# Patient Record
Sex: MALE | Race: WHITE | Employment: FULL TIME | URBAN - NONMETROPOLITAN AREA
[De-identification: names, ages, dates, MRNs, and addresses within clinical notes are randomized per-mention and may not be internally consistent; named-entity substitution may affect disease eponyms.]

---

## 2017-10-08 ENCOUNTER — APPOINTMENT (OUTPATIENT)
Dept: GENERAL RADIOLOGY | Age: 52
End: 2017-10-08
Payer: COMMERCIAL

## 2017-10-08 ENCOUNTER — HOSPITAL ENCOUNTER (EMERGENCY)
Age: 52
Discharge: HOME | End: 2017-10-08
Attending: EMERGENCY MEDICINE
Payer: COMMERCIAL

## 2017-10-08 VITALS
OXYGEN SATURATION: 99 % | WEIGHT: 256 LBS | BODY MASS INDEX: 36.65 KG/M2 | HEART RATE: 88 BPM | DIASTOLIC BLOOD PRESSURE: 77 MMHG | HEIGHT: 70 IN | RESPIRATION RATE: 18 BRPM | TEMPERATURE: 97.2 F | SYSTOLIC BLOOD PRESSURE: 135 MMHG

## 2017-10-08 DIAGNOSIS — S46.001A ROTATOR CUFF INJURY, RIGHT, INITIAL ENCOUNTER: ICD-10-CM

## 2017-10-08 DIAGNOSIS — M25.511 ACUTE PAIN OF RIGHT SHOULDER: Primary | ICD-10-CM

## 2017-10-08 PROCEDURE — 73030 X-RAY EXAM OF SHOULDER: CPT

## 2017-10-08 PROCEDURE — 99283 EMERGENCY DEPT VISIT LOW MDM: CPT

## 2017-10-08 PROCEDURE — A4565 SLINGS: HCPCS

## 2017-10-08 PROCEDURE — 6370000000 HC RX 637 (ALT 250 FOR IP): Performed by: EMERGENCY MEDICINE

## 2017-10-08 ASSESSMENT — PAIN DESCRIPTION - ORIENTATION: ORIENTATION: RIGHT

## 2017-10-08 ASSESSMENT — PAIN SCALES - GENERAL: PAINLEVEL_OUTOF10: 6

## 2017-10-08 ASSESSMENT — PAIN DESCRIPTION - LOCATION: LOCATION: SHOULDER

## 2017-10-08 ASSESSMENT — PAIN DESCRIPTION - PAIN TYPE: TYPE: ACUTE PAIN

## 2017-10-08 NOTE — ED NOTES
Discharge instructions given to patient and wife who both verbalized understanding. AVS reviewed with patient. Color pink. Skin warm and dry to touch. Respirations easy and unlabored. Pain scale 1 to 10 = 2. Sling intact   Good cap refill less than 2 seconds. Strong radial pulse noted. Patient ambulated to car with wife as . Patient has appointment to see Dr Jason Lloyd 10/10/17 at 1250 at [facility].      Titus Monroe RN  10/08/17 Torrie Sargent RN  10/08/17 0792

## 2017-10-08 NOTE — ED NOTES
Pt to ED for right shoulder pain. Pt states he was on a ladder and fell today. Ice applied.      Tresa Stephenson RN  10/08/17 0699

## 2017-10-08 NOTE — ED PROVIDER NOTES
Constitutional: He appears well-developed and well-nourished. He appears distressed. Neck:   No C-spine tenderness   Cardiovascular: Normal rate and regular rhythm. Pulmonary/Chest: Effort normal and breath sounds normal. He exhibits no tenderness. Musculoskeletal:   Examination of the right shoulder showed no deformity, no sign of injury. He has tenderness to the right anterior lateral deltoid area. He is unable to raise his  arm overhead. He has normal motor function and sensation of his right hand and wrist.       DIAGNOSTIC RESULTS       RADIOLOGY:   Right shoulder x-ray showed no fracture or dislocation. LABS:   Labs Reviewed - No data to display    EMERGENCY DEPARTMENT COURSE:   Vitals:    Vitals:    10/08/17 1827   BP: 135/77   Pulse: 88   Resp: 18   Temp: 97.2 °F (36.2 °C)   TempSrc: Oral   SpO2: 99%   Weight: 256 lb (116.1 kg)   Height: 5' 9.5\" (1.765 m)     He received a right shoulder sling and Norco starter pack. Also received ice pack. FINAL IMPRESSION      1. Acute pain of right shoulder    2. Rotator cuff injury, right, initial encounter          DISPOSITION/PLAN   He was discharged home in stable condition with discharge instructions, follow-up with orthopedist in 2 days, return if worse or new symptoms.     PATIENT REFERRED TO:  Petros Amor MD  [facility], [address] [place] [address]  [phone]    In 2 days        DISCHARGE MEDICATIONS:  New Prescriptions    No medications on file       (Please note that portions of this note were completed with a voice recognition program.  Efforts were made to edit the dictations but occasionally words are mis-transcribed.)    MD Sonia Fitzpatrick MD  10/08/17 1495

## 2017-10-17 ENCOUNTER — HOSPITAL ENCOUNTER (OUTPATIENT)
Dept: MRI IMAGING | Age: 52
Discharge: HOME | End: 2017-10-17
Payer: COMMERCIAL

## 2017-10-17 DIAGNOSIS — M25.511 ACUTE PAIN OF RIGHT SHOULDER: ICD-10-CM
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
## 2017-10-17 DIAGNOSIS — S46.011A ROTATOR CUFF STRAIN, RIGHT, INITIAL ENCOUNTER: ICD-10-CM

## 2017-10-19 ENCOUNTER — HOSPITAL ENCOUNTER (OUTPATIENT)
Dept: MRI IMAGING | Age: 52
Discharge: HOME | End: 2017-10-19
Payer: COMMERCIAL
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
## 2017-10-19 DIAGNOSIS — S46.011A ROTATOR CUFF STRAIN, RIGHT, INITIAL ENCOUNTER: ICD-10-CM

## 2017-10-19 DIAGNOSIS — M25.511 ACUTE PAIN OF RIGHT SHOULDER: ICD-10-CM

## 2017-10-19 PROCEDURE — 73221 MRI JOINT UPR EXTREM W/O DYE: CPT

## 2017-10-31 ENCOUNTER — HOSPITAL ENCOUNTER (OUTPATIENT)
Age: 52
Discharge: HOME | End: 2017-10-31
Payer: COMMERCIAL

## 2017-10-31 ENCOUNTER — HOSPITAL ENCOUNTER (OUTPATIENT)
Dept: GENERAL RADIOLOGY | Age: 52
Discharge: HOME | End: 2017-10-31
Payer: COMMERCIAL

## 2017-10-31 DIAGNOSIS — M75.121 COMPLETE TEAR OF RIGHT ROTATOR CUFF: ICD-10-CM

## 2017-10-31 DIAGNOSIS — Z01.818 PREOP EXAMINATION: ICD-10-CM

## 2017-10-31 DIAGNOSIS — M75.41 IMPINGEMENT SYNDROME OF RIGHT SHOULDER: ICD-10-CM

## 2017-10-31 LAB
ANION GAP SERPL CALCULATED.3IONS-SCNC: 12 MEQ/L (ref 8–16)
BASOPHILS # BLD: 0.6 %
BASOPHILS ABSOLUTE: 0.1 THOU/MM3 (ref 0–0.1)
BUN BLDV-MCNC: 16 MG/DL (ref 7–22)
CALCIUM SERPL-MCNC: 9.7 MG/DL (ref 8.5–10.5)
CHLORIDE BLD-SCNC: 105 MEQ/L (ref 98–111)
CO2: 27 MEQ/L (ref 23–33)
CREAT SERPL-MCNC: 0.9 MG/DL (ref 0.4–1.2)
EOSINOPHIL # BLD: 3.4 %
EOSINOPHILS ABSOLUTE: 0.3 THOU/MM3 (ref 0–0.4)
GFR SERPL CREATININE-BSD FRML MDRD: 88 ML/MIN/1.73M2
GLUCOSE BLD-MCNC: 90 MG/DL (ref 70–108)
HCT VFR BLD CALC: 41.9 % (ref 42–52)
HEMOGLOBIN: 14.1 GM/DL (ref 14–18)
LYMPHOCYTES # BLD: 15 %
LYMPHOCYTES ABSOLUTE: 1.4 THOU/MM3 (ref 1–4.8)
MCH RBC QN AUTO: 28 PG (ref 27–31)
MCHC RBC AUTO-ENTMCNC: 33.6 GM/DL (ref 33–37)
MCV RBC AUTO: 83.3 FL (ref 80–94)
MONOCYTES # BLD: 7.4 %
MONOCYTES ABSOLUTE: 0.7 THOU/MM3 (ref 0.4–1.3)
MRSA SCREEN RT-PCR: NEGATIVE
NUCLEATED RED BLOOD CELLS: 0 /100 WBC
PDW BLD-RTO: 14.4 % (ref 11.5–14.5)
PLATELET # BLD: 241 THOU/MM3 (ref 130–400)
PMV BLD AUTO: 9.6 MCM (ref 7.4–10.4)
POTASSIUM SERPL-SCNC: 4.3 MEQ/L (ref 3.5–5.2)
RBC # BLD: 5.04 MILL/MM3 (ref 4.7–6.1)
SEG NEUTROPHILS: 73.6 %
SEGMENTED NEUTROPHILS ABSOLUTE COUNT: 6.7 THOU/MM3 (ref 1.8–7.7)
SODIUM BLD-SCNC: 144 MEQ/L (ref 135–145)
WBC # BLD: 9.1 THOU/MM3 (ref 4.8–10.8)

## 2017-10-31 PROCEDURE — 85025 COMPLETE CBC W/AUTO DIFF WBC: CPT

## 2017-10-31 PROCEDURE — 87641 MR-STAPH DNA AMP PROBE: CPT

## 2017-10-31 PROCEDURE — 71020 XR CHEST STANDARD TWO VW: CPT

## 2017-10-31 PROCEDURE — 93005 ELECTROCARDIOGRAM TRACING: CPT

## 2017-10-31 PROCEDURE — 80048 BASIC METABOLIC PNL TOTAL CA: CPT

## 2017-10-31 PROCEDURE — 36415 COLL VENOUS BLD VENIPUNCTURE: CPT

## 2017-11-06 ENCOUNTER — OFFICE VISIT (OUTPATIENT)
Dept: INTERNAL MEDICINE CLINIC | Age: 52
End: 2017-11-06
Payer: COMMERCIAL

## 2017-11-06 VITALS
HEART RATE: 76 BPM | WEIGHT: 262 LBS | HEIGHT: 69 IN | BODY MASS INDEX: 38.8 KG/M2 | DIASTOLIC BLOOD PRESSURE: 70 MMHG | SYSTOLIC BLOOD PRESSURE: 110 MMHG

## 2017-11-06 DIAGNOSIS — K21.9 GASTROESOPHAGEAL REFLUX DISEASE, ESOPHAGITIS PRESENCE NOT SPECIFIED: ICD-10-CM

## 2017-11-06 DIAGNOSIS — E66.9 OBESITY (BMI 30-39.9): ICD-10-CM

## 2017-11-06 DIAGNOSIS — R94.31 ABNORMAL EKG: ICD-10-CM

## 2017-11-06 DIAGNOSIS — M75.101 TEAR OF RIGHT ROTATOR CUFF, UNSPECIFIED TEAR EXTENT: ICD-10-CM

## 2017-11-06 DIAGNOSIS — Z01.818 PREOP EXAM FOR INTERNAL MEDICINE: Primary | ICD-10-CM

## 2017-11-06 LAB
EKG ATRIAL RATE: 77 BPM
EKG P AXIS: -2 DEGREES
EKG P-R INTERVAL: 154 MS
EKG Q-T INTERVAL: 414 MS
EKG QRS DURATION: 150 MS
EKG QTC CALCULATION (BAZETT): 468 MS
EKG R AXIS: -83 DEGREES
EKG T AXIS: 24 DEGREES
EKG VENTRICULAR RATE: 77 BPM

## 2017-11-06 PROCEDURE — 99244 OFF/OP CNSLTJ NEW/EST MOD 40: CPT | Performed by: INTERNAL MEDICINE

## 2017-11-06 RX ORDER — IBUPROFEN 200 MG
200 TABLET ORAL EVERY 6 HOURS PRN
COMMUNITY

## 2017-11-06 NOTE — PROGRESS NOTES
deteriorated after that     Cancer Father      vocal cord, used alcohol and tobacco     Cancer Brother      bone cancer, broke neck    Heart Attack Brother      massive MI, heavy smoker, 12 pack Mt. Dew daily     Other Brother       under suspicious circumstances     Sudden Death Son      MVA    Glaucoma Neg Hx        Review of Systems - General ROS: negative for - chills or fever  Psychological ROS: negative for - anxiety and depression  Hematological and Lymphatic ROS: No history of blood clots or bleeding disorder. Respiratory ROS: no cough, shortness of breath, or wheezing  Cardiovascular ROS: no chest pain, positive dyspnea on exertion  Gastrointestinal ROS: no abdominal pain, change in bowel habits, or black or bloody stools  Genito-Urinary ROS: no dysuria, trouble voiding, or hematuria  Musculoskeletal ROS: negative for - muscle pain or muscular weakness, positive right shoulder pain  Neurological ROS: negative for - headaches, numbness/tingling, seizures or weakness  Dermatological ROS: negative for - rash or skin lesion changes    Blood pressure 110/70, pulse 76, height 5' 9\" (1.753 m), weight 262 lb (118.8 kg). Physical Examination: General appearance -alert, well appearing, and in no distress  Mental status - alert, oriented to person, place, and time  Head - atraumatic, normocephalic  Eyes - pupils equal and reactive to light, extraocular muscles intact  Ears - external ears are normal, hearing is grossly normal  Mouth - oral pharynx clear, mucous membranes moist  Neck - supple, no significant adenopathy  Chest - clear to auscultation, no wheezes, rales or rhonchi, symmetric air entry  Heart - normal rate, regular rhythm, normal S1, S2, no murmurs, rubs, clicks or gallops  Abdomen -soft, nontender, nondistended  Neurological - alert, oriented, cranial nerves II-XII intact, motor and sensation are grossly intact bilateral upper and lower extremities.   Extremities - peripheral pulses normal, no pedal edema, no clubbing or cyanosis  Skin - warm and dry    Diagnostic data:   I have reviewed recent diagnostic testing including labs, EKG, CXR. EKG with bifascicular block - last stress test 20 years ago. Brother  in 29's with sudden death. Assessment and Plan:    Patient is an acceptable surgical candidate for planned procedure pending stress test.    Cardiac risk assessment:  Patient has intermediate clinical predictors of cardiac risk and tolerates less than 4 mets of activity. Patient is scheduled for an elective intermediate risk surgery and is considered at an acceptable cardiac risk based on ACC/AHA criteria if stress test acceptable. Patient describes pain with hip and knee on the right - will order Lexiscan stress test rather than exercise stress test due to this. 1. Preop exam for internal medicine     2. Tear of right rotator cuff, unspecified tear extent     3. Abnormal EKG  Stress test, lexiscan   4. Obesity (BMI 30-39.9)     5. Gastroesophageal reflux disease, esophagitis presence not specified       Stop ibuprofen one week prior to surgery. Obesity - BMI 38.7 - recommended weight loss by decreasing calories, better choices of food and exercise as tolerates. GERD - controlled, just uses medication prn. DVT prophylaxis - Recommend early ambulation, venous return exercises. Allergies: Review of patient's allergies indicates no known allergies. Isabelle Blackwood MD    Addendum 11/10/17 11:20: Reviewed stress test - no ischemia and normal EF. May proceed with surgery. Isabelle Blackwood MD     [facility] [place] [facility]  PHYSICIANS [facility]. [place] [address]  [address]  [address] [place] [address]  Dept: [phone]  Dept Fax: [phone] : [phone]

## 2017-11-28 ENCOUNTER — HOSPITAL ENCOUNTER (OUTPATIENT)
Age: 52
Discharge: HOME | End: 2017-11-28
Payer: COMMERCIAL

## 2017-11-28 ENCOUNTER — HOSPITAL ENCOUNTER (OUTPATIENT)
Dept: GENERAL RADIOLOGY | Age: 52
Discharge: HOME | End: 2017-11-28
Payer: COMMERCIAL

## 2017-11-28 DIAGNOSIS — M25.511 RIGHT SHOULDER PAIN, UNSPECIFIED CHRONICITY: ICD-10-CM

## 2017-11-28 PROCEDURE — 73030 X-RAY EXAM OF SHOULDER: CPT

## 2017-12-28 ENCOUNTER — OFFICE VISIT (OUTPATIENT)
Dept: OPTOMETRY | Age: 52
End: 2017-12-28
Payer: COMMERCIAL

## 2017-12-28 DIAGNOSIS — H52.203 HYPEROPIA OF BOTH EYES WITH ASTIGMATISM AND PRESBYOPIA: Primary | ICD-10-CM

## 2017-12-28 DIAGNOSIS — H52.03 HYPEROPIA OF BOTH EYES WITH ASTIGMATISM AND PRESBYOPIA: Primary | ICD-10-CM

## 2017-12-28 DIAGNOSIS — H52.4 HYPEROPIA OF BOTH EYES WITH ASTIGMATISM AND PRESBYOPIA: Primary | ICD-10-CM

## 2017-12-28 PROCEDURE — 92014 COMPRE OPH EXAM EST PT 1/>: CPT | Performed by: OPTOMETRIST

## 2017-12-28 RX ORDER — BENOXINATE HCL/FLUORESCEIN SOD 0.4%-0.25%
1 DROPS OPHTHALMIC (EYE) ONCE
Status: COMPLETED | OUTPATIENT
Start: 2017-12-28 | End: 2017-12-28

## 2017-12-28 RX ORDER — HYDROCODONE BITARTRATE AND ACETAMINOPHEN 5; 325 MG/1; MG/1
1 TABLET ORAL EVERY 6 HOURS PRN
COMMUNITY
End: 2019-01-09

## 2017-12-28 RX ADMIN — Medication 1 DROP: at 14:09

## 2017-12-28 ASSESSMENT — REFRACTION_WEARINGRX
OS_CYLINDER: -0.75
OS_AXIS: 077
SPECS_TYPE: PAL
OD_SPHERE: +1.00
OD_CYLINDER: -1.00
OS_SPHERE: +0.25
OD_AXIS: 070
OD_ADD: +2.00
OS_ADD: +2.00

## 2017-12-28 ASSESSMENT — VISUAL ACUITY
OD_CC: 20/25 OU
CORRECTION_TYPE: GLASSES
OS_CC: 20/20
METHOD: SNELLEN - LINEAR

## 2017-12-28 ASSESSMENT — REFRACTION_MANIFEST
OD_AXIS: 071
OD_CYLINDER: -1.50
OS_CYLINDER: -1.25
OD_SPHERE: +1.25
OS_SPHERE: +0.75
OS_AXIS: 080
OS_ADD: +2.00
OD_ADD: +2.00

## 2017-12-28 ASSESSMENT — TONOMETRY
OD_IOP_MMHG: 16
IOP_METHOD: APPLANATION W FLURESS DROP
OS_IOP_MMHG: 15

## 2017-12-28 ASSESSMENT — SLIT LAMP EXAM - LIDS
COMMENTS: NORMAL
COMMENTS: NORMAL

## 2018-01-02 ENCOUNTER — HOSPITAL ENCOUNTER (OUTPATIENT)
Dept: GENERAL RADIOLOGY | Age: 53
Discharge: HOME | End: 2018-01-02
Payer: COMMERCIAL

## 2018-01-02 ENCOUNTER — HOSPITAL ENCOUNTER (OUTPATIENT)
Age: 53
Discharge: HOME | End: 2018-01-02
Payer: COMMERCIAL

## 2018-01-02 DIAGNOSIS — M25.511 RIGHT SHOULDER PAIN, UNSPECIFIED CHRONICITY: ICD-10-CM

## 2018-01-02 PROCEDURE — 73030 X-RAY EXAM OF SHOULDER: CPT

## 2018-04-02 ENCOUNTER — HOSPITAL ENCOUNTER (OUTPATIENT)
Dept: ULTRASOUND IMAGING | Age: 53
Discharge: HOME | End: 2018-04-02
Payer: COMMERCIAL

## 2018-04-02 DIAGNOSIS — N43.3 HYDROCELE, UNSPECIFIED HYDROCELE TYPE: ICD-10-CM

## 2018-04-02 PROCEDURE — 76870 US EXAM SCROTUM: CPT

## 2018-04-04 ENCOUNTER — HOSPITAL ENCOUNTER (OUTPATIENT)
Age: 53
Discharge: HOME | End: 2018-04-04
Payer: COMMERCIAL

## 2018-04-04 LAB
ANION GAP SERPL CALCULATED.3IONS-SCNC: 13 MEQ/L (ref 8–16)
BASOPHILS # BLD: 0.6 %
BASOPHILS ABSOLUTE: 0 THOU/MM3 (ref 0–0.1)
BUN BLDV-MCNC: 14 MG/DL (ref 7–22)
CALCIUM SERPL-MCNC: 9.8 MG/DL (ref 8.5–10.5)
CHLORIDE BLD-SCNC: 103 MEQ/L (ref 98–111)
CHOLESTEROL, TOTAL: 173 MG/DL (ref 100–199)
CO2: 28 MEQ/L (ref 23–33)
CREAT SERPL-MCNC: 0.8 MG/DL (ref 0.4–1.2)
EOSINOPHIL # BLD: 3.2 %
EOSINOPHILS ABSOLUTE: 0.2 THOU/MM3 (ref 0–0.4)
GFR SERPL CREATININE-BSD FRML MDRD: > 90 ML/MIN/1.73M2
GLUCOSE BLD-MCNC: 91 MG/DL (ref 70–108)
HCT VFR BLD CALC: 39.7 % (ref 42–52)
HDLC SERPL-MCNC: 36 MG/DL
HEMOGLOBIN: 13.5 GM/DL (ref 14–18)
LDL CHOLESTEROL CALCULATED: 110 MG/DL
LYMPHOCYTES # BLD: 18.5 %
LYMPHOCYTES ABSOLUTE: 1.4 THOU/MM3 (ref 1–4.8)
MCH RBC QN AUTO: 27.8 PG (ref 27–31)
MCHC RBC AUTO-ENTMCNC: 34 GM/DL (ref 33–37)
MCV RBC AUTO: 81.9 FL (ref 80–94)
MONOCYTES # BLD: 6 %
MONOCYTES ABSOLUTE: 0.5 THOU/MM3 (ref 0.4–1.3)
NUCLEATED RED BLOOD CELLS: 0 /100 WBC
PDW BLD-RTO: 14.3 % (ref 11.5–14.5)
PLATELET # BLD: 241 THOU/MM3 (ref 130–400)
PMV BLD AUTO: 9.1 FL (ref 7.4–10.4)
POTASSIUM SERPL-SCNC: 4.5 MEQ/L (ref 3.5–5.2)
RBC # BLD: 4.85 MILL/MM3 (ref 4.7–6.1)
SEG NEUTROPHILS: 71.7 %
SEGMENTED NEUTROPHILS ABSOLUTE COUNT: 5.5 THOU/MM3 (ref 1.8–7.7)
SODIUM BLD-SCNC: 144 MEQ/L (ref 135–145)
TRIGL SERPL-MCNC: 134 MG/DL (ref 0–199)
WBC # BLD: 7.7 THOU/MM3 (ref 4.8–10.8)

## 2018-04-04 PROCEDURE — 83036 HEMOGLOBIN GLYCOSYLATED A1C: CPT

## 2018-04-04 PROCEDURE — G0103 PSA SCREENING: HCPCS

## 2018-04-04 PROCEDURE — 80048 BASIC METABOLIC PNL TOTAL CA: CPT

## 2018-04-04 PROCEDURE — 80061 LIPID PANEL: CPT

## 2018-04-04 PROCEDURE — 85025 COMPLETE CBC W/AUTO DIFF WBC: CPT

## 2018-04-04 PROCEDURE — 36415 COLL VENOUS BLD VENIPUNCTURE: CPT

## 2018-04-05 LAB
AVERAGE GLUCOSE: 105 MG/DL (ref 70–126)
HBA1C MFR BLD: 5.5 % (ref 4.4–6.4)
PROSTATE SPECIFIC ANTIGEN: 0.44 NG/ML (ref 0–1)

## 2019-01-09 ENCOUNTER — OFFICE VISIT (OUTPATIENT)
Dept: OPTOMETRY | Age: 54
End: 2019-01-09
Payer: COMMERCIAL

## 2019-01-09 DIAGNOSIS — H52.03 HYPEROPIA OF BOTH EYES WITH ASTIGMATISM AND PRESBYOPIA: Primary | ICD-10-CM

## 2019-01-09 DIAGNOSIS — H52.203 HYPEROPIA OF BOTH EYES WITH ASTIGMATISM AND PRESBYOPIA: Primary | ICD-10-CM

## 2019-01-09 DIAGNOSIS — H52.4 HYPEROPIA OF BOTH EYES WITH ASTIGMATISM AND PRESBYOPIA: Primary | ICD-10-CM

## 2019-01-09 PROCEDURE — 92014 COMPRE OPH EXAM EST PT 1/>: CPT | Performed by: OPTOMETRIST

## 2019-01-09 RX ORDER — TAMSULOSIN HYDROCHLORIDE 0.4 MG/1
CAPSULE ORAL
COMMUNITY
Start: 2018-12-15

## 2019-01-09 ASSESSMENT — REFRACTION_MANIFEST
OS_ADD: +2.25
OS_AXIS: 080
OD_ADD: +2.25
OS_SPHERE: +0.75
OS_CYLINDER: -1.25
OD_AXIS: 071
OD_CYLINDER: -1.50
OD_SPHERE: +1.25

## 2019-01-09 ASSESSMENT — VISUAL ACUITY
OD_CC+: -1
METHOD: SNELLEN - LINEAR
OD_CC: 20/25 OU
OS_CC: 20/25
CORRECTION_TYPE: GLASSES

## 2019-01-09 ASSESSMENT — TONOMETRY
OD_IOP_MMHG: 12
IOP_METHOD: NON-CONTACT AIR PUFF
OS_IOP_MMHG: 11

## 2019-01-09 ASSESSMENT — REFRACTION_WEARINGRX
OS_CYLINDER: -1.25
OS_SPHERE: +0.75
OD_ADD: +2.00
OD_AXIS: 071
OS_ADD: +2.00
OD_SPHERE: +1.25
OD_CYLINDER: -1.50
SPECS_TYPE: PAL
OS_AXIS: 080

## 2019-01-09 ASSESSMENT — KERATOMETRY
OS_AXISANGLE_DEGREES: 066
OD_K2POWER_DIOPTERS: 44.00
OS_K2POWER_DIOPTERS: 44.00
OD_AXISANGLE_DEGREES: 122
OD_AXISANGLE2_DEGREES: 032
OS_K1POWER_DIOPTERS: 43.75
OD_K1POWER_DIOPTERS: 43.75
OS_AXISANGLE2_DEGREES: 156

## 2019-01-09 ASSESSMENT — SLIT LAMP EXAM - LIDS
COMMENTS: NORMAL
COMMENTS: NORMAL

## 2019-05-05 ENCOUNTER — HOSPITAL ENCOUNTER (OUTPATIENT)
Age: 54
Discharge: HOME | End: 2019-05-05
Payer: COMMERCIAL

## 2019-05-05 LAB
ALT SERPL-CCNC: 15 U/L (ref 11–66)
ANION GAP SERPL CALCULATED.3IONS-SCNC: 12 MEQ/L (ref 8–16)
AST SERPL-CCNC: 17 U/L (ref 5–40)
BASOPHILS # BLD: 0.6 %
BASOPHILS ABSOLUTE: 0 THOU/MM3 (ref 0–0.1)
BUN BLDV-MCNC: 18 MG/DL (ref 7–22)
CALCIUM SERPL-MCNC: 8.9 MG/DL (ref 8.5–10.5)
CHLORIDE BLD-SCNC: 107 MEQ/L (ref 98–111)
CHOLESTEROL, FASTING: 170 MG/DL (ref 100–199)
CO2: 24 MEQ/L (ref 23–33)
CREAT SERPL-MCNC: 0.7 MG/DL (ref 0.4–1.2)
EOSINOPHIL # BLD: 3.9 %
EOSINOPHILS ABSOLUTE: 0.3 THOU/MM3 (ref 0–0.4)
ERYTHROCYTE [DISTWIDTH] IN BLOOD BY AUTOMATED COUNT: 13.9 % (ref 11.5–14.5)
ERYTHROCYTE [DISTWIDTH] IN BLOOD BY AUTOMATED COUNT: 42.5 FL (ref 35–45)
GFR SERPL CREATININE-BSD FRML MDRD: > 90 ML/MIN/1.73M2
GLUCOSE FASTING: 97 MG/DL (ref 70–108)
HCT VFR BLD CALC: 41.9 % (ref 42–52)
HDLC SERPL-MCNC: 37 MG/DL
HEMOGLOBIN: 13.3 GM/DL (ref 14–18)
IMMATURE GRANS (ABS): 0.02 THOU/MM3 (ref 0–0.07)
IMMATURE GRANULOCYTES: 0.3 %
LDL CHOLESTEROL CALCULATED: 114 MG/DL
LYMPHOCYTES # BLD: 16.5 %
LYMPHOCYTES ABSOLUTE: 1.1 THOU/MM3 (ref 1–4.8)
MCH RBC QN AUTO: 27 PG (ref 26–33)
MCHC RBC AUTO-ENTMCNC: 31.7 GM/DL (ref 32.2–35.5)
MCV RBC AUTO: 85.2 FL (ref 80–94)
MONOCYTES # BLD: 6.6 %
MONOCYTES ABSOLUTE: 0.4 THOU/MM3 (ref 0.4–1.3)
NUCLEATED RED BLOOD CELLS: 0 /100 WBC
PLATELET # BLD: 232 THOU/MM3 (ref 130–400)
PMV BLD AUTO: 11.1 FL (ref 9.4–12.4)
POTASSIUM SERPL-SCNC: 4 MEQ/L (ref 3.5–5.2)
RBC # BLD: 4.92 MILL/MM3 (ref 4.7–6.1)
SEG NEUTROPHILS: 72.1 %
SEGMENTED NEUTROPHILS ABSOLUTE COUNT: 4.8 THOU/MM3 (ref 1.8–7.7)
SODIUM BLD-SCNC: 143 MEQ/L (ref 135–145)
TRIGLYCERIDE, FASTING: 95 MG/DL (ref 0–199)
TSH SERPL DL<=0.05 MIU/L-ACNC: 1.7 UIU/ML (ref 0.4–4.2)
WBC # BLD: 6.7 THOU/MM3 (ref 4.8–10.8)

## 2019-05-05 PROCEDURE — 84450 TRANSFERASE (AST) (SGOT): CPT

## 2019-05-05 PROCEDURE — 80061 LIPID PANEL: CPT

## 2019-05-05 PROCEDURE — 85025 COMPLETE CBC W/AUTO DIFF WBC: CPT

## 2019-05-05 PROCEDURE — 84443 ASSAY THYROID STIM HORMONE: CPT

## 2019-05-05 PROCEDURE — 84460 ALANINE AMINO (ALT) (SGPT): CPT

## 2019-05-05 PROCEDURE — 36415 COLL VENOUS BLD VENIPUNCTURE: CPT

## 2019-05-05 PROCEDURE — 80048 BASIC METABOLIC PNL TOTAL CA: CPT

## 2020-05-14 ENCOUNTER — OFFICE VISIT (OUTPATIENT)
Dept: OPTOMETRY | Age: 55
End: 2020-05-14
Payer: COMMERCIAL

## 2020-05-14 PROCEDURE — 92014 COMPRE OPH EXAM EST PT 1/>: CPT | Performed by: OPTOMETRIST

## 2020-05-14 ASSESSMENT — REFRACTION_WEARINGRX
OS_ADD: +2.00
OD_CYLINDER: -1.50
SPECS_TYPE: PAL
OD_ADD: +2.00
OS_AXIS: 080
OD_SPHERE: +1.25
OD_AXIS: 071
OS_CYLINDER: -1.25
OS_SPHERE: +0.75

## 2020-05-14 ASSESSMENT — REFRACTION_MANIFEST
OS_ADD: +2.25
OD_ADD: +2.25
OD_SPHERE: +1.25
OS_CYLINDER: -1.50
OD_CYLINDER: -1.75
OS_AXIS: 080
OD_AXIS: 075
OS_SPHERE: +0.75

## 2020-05-14 ASSESSMENT — TONOMETRY
OD_IOP_MMHG: 14
OS_IOP_MMHG: 14
IOP_METHOD: NON-CONTACT AIR PUFF

## 2020-05-14 ASSESSMENT — KERATOMETRY
OS_AXISANGLE_DEGREES: 090
OS_K1POWER_DIOPTERS: 44.00
OS_K2POWER_DIOPTERS: 44.00
OS_AXISANGLE2_DEGREES: 000

## 2020-05-14 ASSESSMENT — SLIT LAMP EXAM - LIDS
COMMENTS: NORMAL
COMMENTS: NORMAL

## 2020-05-14 ASSESSMENT — VISUAL ACUITY
OS_CC+: -1
METHOD: SNELLEN - LINEAR
OD_CC: 20/20 OU
CORRECTION_TYPE: GLASSES
OS_CC: 20/20

## 2020-05-14 NOTE — PROGRESS NOTES
+2.00    Age:  3yrs    Type:  PAL              Manifest Refraction     Manifest Refraction       Sphere Cylinder Axis Dist VA Add    Right +1.25 -1.75 075 20/20 +2.25    Left +0.75 -1.50 080 20/20 +2.25          Manifest Refraction #2 (Auto)       Sphere Cylinder Axis Dist VA Add    Right +1.75 -1.75 079      Left +1.50 -1.50 080                 Final Rx       Sphere Cylinder Axis Add    Right +1.25 -1.75 075 +2.25    Left +0.75 -1.50 080 +2.25    Type:  PAL    Expiration Date:  5/15/2022            1. Hyperopia of both eyes with astigmatism and presbyopia           Patient Instructions   New glasses recommended ; Rx printed to have until the optical shoppe is open again.       Return in about 2 years (around 5/14/2022) for complete eye exam. Doxepin Counseling:  Patient advised that the medication is sedating and not to drive a car after taking this medication. Patient informed of potential adverse effects including but not limited to dry mouth, urinary retention, and blurry vision.  The patient verbalized understanding of the proper use and possible adverse effects of doxepin.  All of the patient's questions and concerns were addressed.

## 2020-11-27 ENCOUNTER — HOSPITAL ENCOUNTER (OUTPATIENT)
Age: 55
Discharge: HOME | End: 2020-11-27
Payer: COMMERCIAL

## 2020-11-27 ENCOUNTER — HOSPITAL ENCOUNTER (OUTPATIENT)
Dept: GENERAL RADIOLOGY | Age: 55
Discharge: HOME | End: 2020-11-27
Payer: COMMERCIAL

## 2020-11-27 PROCEDURE — 72100 X-RAY EXAM L-S SPINE 2/3 VWS: CPT

## 2021-07-31 ENCOUNTER — OFFICE VISIT (OUTPATIENT)
Dept: PRIMARY CARE CLINIC | Age: 56
End: 2021-07-31
Payer: COMMERCIAL

## 2021-07-31 ENCOUNTER — HOSPITAL ENCOUNTER (OUTPATIENT)
Age: 56
Setting detail: SPECIMEN
Discharge: HOME | End: 2021-07-31
Payer: COMMERCIAL

## 2021-07-31 VITALS
BODY MASS INDEX: 38.8 KG/M2 | DIASTOLIC BLOOD PRESSURE: 80 MMHG | WEIGHT: 271 LBS | OXYGEN SATURATION: 96 % | HEIGHT: 70 IN | HEART RATE: 100 BPM | SYSTOLIC BLOOD PRESSURE: 136 MMHG | TEMPERATURE: 98.8 F

## 2021-07-31 DIAGNOSIS — R19.7 DIARRHEA, UNSPECIFIED TYPE: ICD-10-CM

## 2021-07-31 DIAGNOSIS — R19.7 DIARRHEA, UNSPECIFIED TYPE: Primary | ICD-10-CM

## 2021-07-31 PROCEDURE — 99203 OFFICE O/P NEW LOW 30 MIN: CPT | Performed by: FAMILY MEDICINE

## 2021-07-31 PROCEDURE — U0003 INFECTIOUS AGENT DETECTION BY NUCLEIC ACID (DNA OR RNA); SEVERE ACUTE RESPIRATORY SYNDROME CORONAVIRUS 2 (SARS-COV-2) (CORONAVIRUS DISEASE [COVID-19]), AMPLIFIED PROBE TECHNIQUE, MAKING USE OF HIGH THROUGHPUT TECHNOLOGIES AS DESCRIBED BY CMS-2020-01-R: HCPCS

## 2021-07-31 PROCEDURE — U0005 INFEC AGEN DETEC AMPLI PROBE: HCPCS

## 2021-07-31 RX ORDER — OXYBUTYNIN CHLORIDE 10 MG/1
TABLET, EXTENDED RELEASE ORAL
COMMUNITY
Start: 2021-07-14

## 2021-07-31 RX ORDER — ATORVASTATIN CALCIUM 10 MG/1
TABLET, FILM COATED ORAL
COMMUNITY
Start: 2021-05-13

## 2021-07-31 SDOH — ECONOMIC STABILITY: FOOD INSECURITY: WITHIN THE PAST 12 MONTHS, THE FOOD YOU BOUGHT JUST DIDN'T LAST AND YOU DIDN'T HAVE MONEY TO GET MORE.: NEVER TRUE

## 2021-07-31 SDOH — ECONOMIC STABILITY: FOOD INSECURITY: WITHIN THE PAST 12 MONTHS, YOU WORRIED THAT YOUR FOOD WOULD RUN OUT BEFORE YOU GOT MONEY TO BUY MORE.: NEVER TRUE

## 2021-07-31 ASSESSMENT — ENCOUNTER SYMPTOMS
EYE REDNESS: 0
RHINORRHEA: 0
SORE THROAT: 0
COUGH: 0
TROUBLE SWALLOWING: 0
WHEEZING: 0
VOMITING: 0
ABDOMINAL PAIN: 0
SHORTNESS OF BREATH: 0
DIARRHEA: 1
SINUS PRESSURE: 0
CONSTIPATION: 0
NAUSEA: 0
EYE DISCHARGE: 0

## 2021-07-31 ASSESSMENT — SOCIAL DETERMINANTS OF HEALTH (SDOH): HOW HARD IS IT FOR YOU TO PAY FOR THE VERY BASICS LIKE FOOD, HOUSING, MEDICAL CARE, AND HEATING?: NOT HARD AT ALL

## 2021-07-31 NOTE — PROGRESS NOTES
2021     Chloe Goetz (:  1965) is a 64 y.o. male, here for evaluation of the following medical concerns:    Diarrhea   This is a new problem. The current episode started in the past 7 days (2 days duration ). The problem occurs 5 to 10 times per day. The problem has been unchanged. The stool consistency is described as watery. The patient states that diarrhea does not awaken him from sleep. Pertinent negatives include no abdominal pain, arthralgias, chills, coughing, fever, headaches, myalgias or vomiting. Associated symptoms comments: Decreased appetite. . Treatments tried: anti-diarrhea medication. The treatment provided no relief. Did review patient's med list, allergies, social history,pmhx and pshx today as noted in the record. Review of Systems   Constitutional: Positive for appetite change. Negative for chills, fatigue and fever. HENT: Negative for congestion, ear pain, postnasal drip, rhinorrhea, sinus pressure, sore throat and trouble swallowing. Eyes: Negative for discharge and redness. Respiratory: Negative for cough, shortness of breath and wheezing. Cardiovascular: Negative for chest pain. Gastrointestinal: Positive for diarrhea. Negative for abdominal pain, constipation, nausea and vomiting. Genitourinary: Negative for dysuria, flank pain, frequency and urgency. Musculoskeletal: Negative for arthralgias, myalgias and neck pain. Skin: Negative for rash and wound. Allergic/Immunologic: Negative for environmental allergies. Neurological: Negative for dizziness, weakness, light-headedness and headaches. Hematological: Negative for adenopathy. Psychiatric/Behavioral: Negative. Prior to Visit Medications    Medication Sig Taking?  Authorizing Provider   oxybutynin (DITROPAN-XL) 10 MG extended release tablet TAKE 1 TABLET BY MOUTH EVERY DAY Yes Historical Provider, MD   atorvastatin (LIPITOR) 10 MG tablet TAKE 1 TABLET BY MOUTH EVERY DAY Yes Historical Provider, MD   Omeprazole Magnesium (PRILOSEC OTC PO) Take by mouth Yes Historical Provider, MD   tamsulosin (FLOMAX) 0.4 MG capsule  Yes Historical Provider, MD   ibuprofen (ADVIL;MOTRIN) 200 MG tablet Take 200 mg by mouth every 6 hours as needed for Pain Yes Historical Provider, MD        Social History     Tobacco Use    Smoking status: Never Smoker    Smokeless tobacco: Never Used   Substance Use Topics    Alcohol use: Yes     Comment: rare        Vitals:    07/31/21 1515   BP: 136/80   Site: Left Upper Arm   Position: Sitting   Cuff Size: Large Adult   Pulse: 100   Temp: 98.8 °F (37.1 °C)   TempSrc: Tympanic   SpO2: 96%   Weight: 271 lb (122.9 kg)   Height: 5' 10\" (1.778 m)     Estimated body mass index is 38.88 kg/m² as calculated from the following:    Height as of this encounter: 5' 10\" (1.778 m). Weight as of this encounter: 271 lb (122.9 kg). Physical Exam  Vitals and nursing note reviewed. Constitutional:       General: He is not in acute distress. Appearance: Normal appearance. He is well-developed. He is not diaphoretic. HENT:      Head: Normocephalic and atraumatic. Nose: Nose normal.   Eyes:      General:         Right eye: No discharge. Left eye: No discharge. Conjunctiva/sclera: Conjunctivae normal.   Cardiovascular:      Rate and Rhythm: Normal rate and regular rhythm. Pulmonary:      Effort: Pulmonary effort is normal. No respiratory distress. Breath sounds: Normal breath sounds. No wheezing. Abdominal:      General: Bowel sounds are normal. There is no distension. Palpations: Abdomen is soft. There is no mass. Tenderness: There is abdominal tenderness (mild epigastric ). There is no guarding or rebound. Musculoskeletal:      Cervical back: Normal range of motion and neck supple. No rigidity. Lymphadenopathy:      Cervical: No cervical adenopathy. Skin:     General: Skin is warm and dry.    Neurological:      Mental Status: He is alert and oriented to person, place, and time. Psychiatric:         Behavior: Behavior normal.         Thought Content: Thought content normal.         Judgment: Judgment normal.         ASSESSMENT/PLAN:    Encounter Diagnosis   Name Primary?  Diarrhea, unspecified type Yes     No orders of the defined types were placed in this encounter. Orders Placed This Encounter   Procedures    COVID-19     Standing Status:   Future     Standing Expiration Date:   7/31/2022     Scheduling Instructions:      1) Due to current limited availability of the COVID-19 test, tests will be prioritized based on responses to questions above. Testing may be delayed due to volume. 2) Print and instruct patient to adhere to CDC home isolation program. (Link Above)              3) Set up or refer patient for a monitoring program.              4) Have patient sign up for and leverage [place] (if not previously done). Order Specific Question:   Is this test for diagnosis or screening? Answer:   Diagnosis of ill patient     Order Specific Question:   Symptomatic for COVID-19 as defined by CDC? Answer:   Yes     Order Specific Question:   Date of Symptom Onset     Answer:   7/29/2021     Order Specific Question:   Hospitalized for COVID-19? Answer:   No     Order Specific Question:   Admitted to ICU for COVID-19? Answer:   No     Order Specific Question:   Employed in healthcare setting? Answer:   No     Order Specific Question:   Resident in a congregate (group) care setting? Answer:   No     Order Specific Question:   Pregnant: Answer:   No     Order Specific Question:   Previously tested for COVID-19? Answer:   No     Will order covid testing.   In interim patient is to quarantine until testing reports are available    Increase fluids and rest      Tylenol 1-2 tabs po q4h prn    Can use imodium for diarrhea symptoms    Return  if no improvement in symptoms or if any further symptoms arise.    No follow-ups on file. An electronic signature was used to authenticate this note.     --Annette Hamman, DO on 7/31/2021 at 3:32 PM

## 2021-08-02 LAB
SARS-COV-2: NORMAL
SARS-COV-2: NOT DETECTED
SOURCE: NORMAL

## 2022-04-18 ENCOUNTER — OFFICE VISIT (OUTPATIENT)
Dept: OPTOMETRY | Age: 57
End: 2022-04-18
Payer: COMMERCIAL

## 2022-04-18 DIAGNOSIS — H52.03 HYPEROPIA OF BOTH EYES WITH ASTIGMATISM AND PRESBYOPIA: Primary | ICD-10-CM

## 2022-04-18 DIAGNOSIS — H02.826 CYST OF EYELID, LEFT: ICD-10-CM

## 2022-04-18 DIAGNOSIS — H52.4 HYPEROPIA OF BOTH EYES WITH ASTIGMATISM AND PRESBYOPIA: Primary | ICD-10-CM

## 2022-04-18 DIAGNOSIS — H52.203 HYPEROPIA OF BOTH EYES WITH ASTIGMATISM AND PRESBYOPIA: Primary | ICD-10-CM

## 2022-04-18 PROCEDURE — 92015 DETERMINE REFRACTIVE STATE: CPT | Performed by: OPTOMETRIST

## 2022-04-18 PROCEDURE — 92014 COMPRE OPH EXAM EST PT 1/>: CPT | Performed by: OPTOMETRIST

## 2022-04-18 ASSESSMENT — KERATOMETRY
OS_K2POWER_DIOPTERS: 44.75
OS_AXISANGLE2_DEGREES: 177
OS_K1POWER_DIOPTERS: 44.00
METHOD_AUTO_MANUAL: AUTOMATED
OS_AXISANGLE_DEGREES: 087

## 2022-04-18 ASSESSMENT — TONOMETRY
OS_IOP_MMHG: 16
IOP_METHOD: NON-CONTACT AIR PUFF
OD_IOP_MMHG: 17

## 2022-04-18 ASSESSMENT — REFRACTION_MANIFEST
OS_AXIS: 080
OD_AXIS: 078
OS_CYLINDER: -1.25
OD_ADD: +2.25
OS_SPHERE: +0.75
OS_ADD: +2.25
OD_CYLINDER: -1.00
OD_SPHERE: +0.75

## 2022-04-18 ASSESSMENT — ENCOUNTER SYMPTOMS
GASTROINTESTINAL NEGATIVE: 0
RESPIRATORY NEGATIVE: 0
EYES NEGATIVE: 0
ALLERGIC/IMMUNOLOGIC NEGATIVE: 0

## 2022-04-18 ASSESSMENT — REFRACTION_WEARINGRX
OD_ADD: +2.25
OS_SPHERE: +0.75
OS_CYLINDER: -1.50
OS_ADD: +2.25
SPECS_TYPE: PAL
OD_SPHERE: +1.25
OD_CYLINDER: -1.75
OS_AXIS: 080
OD_AXIS: 075

## 2022-04-18 ASSESSMENT — VISUAL ACUITY
OS_CC: 20/30
CORRECTION_TYPE: GLASSES
METHOD: SNELLEN - LINEAR
OD_CC: 20/20 OU

## 2022-04-18 ASSESSMENT — SLIT LAMP EXAM - LIDS: COMMENTS: NORMAL

## 2022-04-18 NOTE — PROGRESS NOTES
Bety Tarango presents today for   Chief Complaint   Patient presents with    Blurred Vision    Vision Exam   .    HPI     Blurred Vision     Laterality: both eyes    Quality: blurred    Context: distance vision and reading              Comments     Last Vision Exam: 2020 AW  Last Ophthalmology Exam: n/a  Last Filled Glasses Rx: 2020  Insurance: Adelina Lundberg  Update: Glasses  Distance and reading are getting more blurry  Full time glasses                Current Outpatient Medications   Medication Sig Dispense Refill    oxybutynin (DITROPAN-XL) 10 MG extended release tablet TAKE 1 TABLET BY MOUTH EVERY DAY      atorvastatin (LIPITOR) 10 MG tablet TAKE 1 TABLET BY MOUTH EVERY DAY      Omeprazole Magnesium (PRILOSEC OTC PO) Take by mouth      tamsulosin (FLOMAX) 0.4 MG capsule       ibuprofen (ADVIL;MOTRIN) 200 MG tablet Take 200 mg by mouth every 6 hours as needed for Pain       No current facility-administered medications for this visit. Family History   Problem Relation Age of Onset    Other Mother         broken hip, deteriorated after that     Cancer Father         vocal cord, used alcohol and tobacco     Cancer Brother         bone cancer, broke neck    Heart Attack Brother         massive MI, heavy smoker, 12 pack Mt.  Dew daily     Other Brother          under suspicious circumstances     Sudden Death Son         MVA    Glaucoma Neg Hx     Diabetes Neg Hx     Cataracts Neg Hx      Social History     Socioeconomic History    Marital status:      Spouse name: None    Number of children: None    Years of education: None    Highest education level: None   Occupational History    None   Tobacco Use    Smoking status: Never Smoker    Smokeless tobacco: Never Used   Substance and Sexual Activity    Alcohol use: Yes     Comment: rare    Drug use: No    Sexual activity: None   Other Topics Concern    None   Social History Narrative    None     Social Determinants of Health     Financial Resource Strain: Low Risk     Difficulty of Paying Living Expenses: Not hard at all   Food Insecurity: No Food Insecurity    Worried About Running Out of Food in the Last Year: Never true    Gisell of Food in the Last Year: Never true   Transportation Needs:     Lack of Transportation (Medical): Not on file    Lack of Transportation (Non-Medical): Not on file   Physical Activity:     Days of Exercise per Week: Not on file    Minutes of Exercise per Session: Not on file   Stress:     Feeling of Stress : Not on file   Social Connections:     Frequency of Communication with Friends and Family: Not on file    Frequency of Social Gatherings with Friends and Family: Not on file    Attends [employer]ism Services: Not on file    Active Member of [address] [place] [address] [employer] or Organizations: Not on file    Attends Club or Organization Meetings: Not on file    Marital Status: Not on file   Intimate Partner Violence:     Fear of Current or Ex-Partner: Not on file    Emotionally Abused: Not on file    Physically Abused: Not on file    Sexually Abused: Not on file   Housing Stability:     Unable to Pay for Housing in the Last Year: Not on file    Number of [place] in the Last Year: Not on file    Unstable Housing in the Last Year: Not on file     Past Medical History:   Diagnosis Date    GERD (gastroesophageal reflux disease)     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 9)     Urinary frequency        ROS     Negative for: Constitutional, Gastrointestinal, Neurological, Skin, Genitourinary, Musculoskeletal, HENT, Endocrine, Cardiovascular, Eyes, Respiratory, Psychiatric, Allergic/Imm, Heme/Lymph          Main Ophthalmology Exam     External Exam       Right Left    External Normal Normal          Slit Lamp Exam       Right Left    Lids/Lashes Normal lower inner canthus and out cantus lower lid     Conjunctiva/Sclera White and quiet White and quiet    Cornea Clear Clear    Anterior Chamber Deep and quiet Deep and quiet    Iris Round and reactive Round and reactive    Lens Clear Clear    Vitreous Normal Normal          Fundus Exam       Right Left    Disc Normal Normal    C/D Ratio 0.25 0.25    Macula Normal Normal    Vessels Normal Normal             <div id=\"MAIN_EXAM_REVIEWED\"></div>      Tonometry     Tonometry (Non-contact air puff, 2:20 PM)       Right Left    Pressure 17 16   IOP.8             159  CH:  10.3          10.4  WS: 8.6          7.7                 Not recorded       Not recorded         Visual Acuity (Snellen - Linear)       Right Left    Dist cc 20/30 20/30    Near cc 20/20 OU     Correction: Glasses          Pupils     Pupils       Pupils    Right PERRL    Left PERRL              Neuro/Psych     Neuro/Psych     Oriented x3: Yes    Mood/Affect: Normal              Keratometry     Keratometry (Automated)       K1 Axis K2 Axis    Right        Left 44.00 177 44.75 087                  Ophthalmology Exam     Wearing Rx       Sphere Cylinder Axis Add    Right +1.25 -1.75 075 +2.25    Left +0.75 -1.50 080 +2.25    Age: 2yrs    Type: PAL              Manifest Refraction     Manifest Refraction       Sphere Cylinder Axis Dist VA Add    Right +0.75 -1.00 078 20/25 +2.25    Left +0.75 -1.25 080 20/25 +2.25          Manifest Refraction #2 (Auto)       Sphere Cylinder Axis Dist VA Add    Right +0.50 -0.75 033      Left +0.75 -1.50 080                 Final Rx       Sphere Cylinder Axis Add    Right +0.75 -1.00 078 +2.25    Left +0.75 -1.25 080 +2.25    Type: PAL    Expiration Date: 2024            Orders Placed This Encounter   Procedures    Amb External Referral To Ophthalmology         IMPRESSION:  1. Hyperopia of both eyes with astigmatism and presbyopia    2. Cyst of eyelid, left        PLAN:    1. New glasses recommended   2. Referral to [facility] ; has had two similar cysts removed in past several years ago;   These are in different locations than previous     There are no Patient Instructions on file for this visit.    Return in about 1 year (around 4/18/2023) for complete eye exam.

## 2022-05-09 ENCOUNTER — HOSPITAL ENCOUNTER (OUTPATIENT)
Age: 57
Discharge: HOME | End: 2022-05-09
Payer: COMMERCIAL

## 2022-05-09 LAB
BASOPHILS # BLD: 0.8 %
BASOPHILS ABSOLUTE: 0 THOU/MM3 (ref 0–0.1)
EOSINOPHIL # BLD: 4.8 %
EOSINOPHILS ABSOLUTE: 0.3 THOU/MM3 (ref 0–0.4)
ERYTHROCYTE [DISTWIDTH] IN BLOOD BY AUTOMATED COUNT: 14.3 % (ref 11.5–14.5)
ERYTHROCYTE [DISTWIDTH] IN BLOOD BY AUTOMATED COUNT: 45.4 FL (ref 35–45)
HCT VFR BLD CALC: 43 % (ref 42–52)
HEMOGLOBIN: 13.4 GM/DL (ref 14–18)
IMMATURE GRANS (ABS): 0.01 THOU/MM3 (ref 0–0.07)
IMMATURE GRANULOCYTES: 0.2 %
LYMPHOCYTES # BLD: 19.6 %
LYMPHOCYTES ABSOLUTE: 1.2 THOU/MM3 (ref 1–4.8)
MCH RBC QN AUTO: 27.1 PG (ref 26–33)
MCHC RBC AUTO-ENTMCNC: 31.2 GM/DL (ref 32.2–35.5)
MCV RBC AUTO: 86.9 FL (ref 80–94)
MONOCYTES # BLD: 6.4 %
MONOCYTES ABSOLUTE: 0.4 THOU/MM3 (ref 0.4–1.3)
NUCLEATED RED BLOOD CELLS: 0 /100 WBC
PLATELET # BLD: 244 THOU/MM3 (ref 130–400)
PMV BLD AUTO: 10.6 FL (ref 9.4–12.4)
RBC # BLD: 4.95 MILL/MM3 (ref 4.7–6.1)
SEG NEUTROPHILS: 68.2 %
SEGMENTED NEUTROPHILS ABSOLUTE COUNT: 4.2 THOU/MM3 (ref 1.8–7.7)
WBC # BLD: 6.1 THOU/MM3 (ref 4.8–10.8)

## 2022-05-09 PROCEDURE — 80048 BASIC METABOLIC PNL TOTAL CA: CPT

## 2022-05-09 PROCEDURE — 36415 COLL VENOUS BLD VENIPUNCTURE: CPT

## 2022-05-09 PROCEDURE — 84460 ALANINE AMINO (ALT) (SGPT): CPT

## 2022-05-09 PROCEDURE — 80061 LIPID PANEL: CPT

## 2022-05-09 PROCEDURE — 85025 COMPLETE CBC W/AUTO DIFF WBC: CPT

## 2022-05-09 PROCEDURE — G0103 PSA SCREENING: HCPCS

## 2022-05-09 PROCEDURE — 84450 TRANSFERASE (AST) (SGOT): CPT

## 2022-05-10 LAB
ALT SERPL-CCNC: 12 U/L (ref 11–66)
ANION GAP SERPL CALCULATED.3IONS-SCNC: 16 MEQ/L (ref 8–16)
AST SERPL-CCNC: 18 U/L (ref 5–40)
BUN BLDV-MCNC: 14 MG/DL (ref 7–22)
CALCIUM SERPL-MCNC: 9.3 MG/DL (ref 8.5–10.5)
CHLORIDE BLD-SCNC: 103 MEQ/L (ref 98–111)
CHOLESTEROL, TOTAL: 158 MG/DL (ref 100–199)
CO2: 23 MEQ/L (ref 23–33)
CREAT SERPL-MCNC: 0.7 MG/DL (ref 0.4–1.2)
GFR SERPL CREATININE-BSD FRML MDRD: > 90 ML/MIN/1.73M2
GLUCOSE BLD-MCNC: 96 MG/DL (ref 70–108)
HDLC SERPL-MCNC: 36 MG/DL
LDL CHOLESTEROL CALCULATED: 94 MG/DL
POTASSIUM SERPL-SCNC: 4.1 MEQ/L (ref 3.5–5.2)
PROSTATE SPECIFIC ANTIGEN: 0.67 NG/ML (ref 0–1)
SODIUM BLD-SCNC: 142 MEQ/L (ref 135–145)
TRIGL SERPL-MCNC: 142 MG/DL (ref 0–199)

## 2023-07-16 ENCOUNTER — HOSPITAL ENCOUNTER (OUTPATIENT)
Age: 58
Discharge: HOME | End: 2023-07-16
Payer: COMMERCIAL

## 2023-07-16 LAB
25(OH)D3 SERPL-MCNC: 22 NG/ML (ref 30–100)
ANION GAP SERPL CALC-SCNC: 9 MEQ/L (ref 8–16)
BASOPHILS ABSOLUTE: 0 THOU/MM3 (ref 0–0.1)
BASOPHILS NFR BLD AUTO: 0.6 %
BUN SERPL-MCNC: 12 MG/DL (ref 7–22)
CALCIUM SERPL-MCNC: 9 MG/DL (ref 8.5–10.5)
CHLORIDE SERPL-SCNC: 108 MEQ/L (ref 98–111)
CHOLEST SERPL-MCNC: 113 MG/DL (ref 100–199)
CO2 SERPL-SCNC: 26 MEQ/L (ref 23–33)
CREAT SERPL-MCNC: 0.7 MG/DL (ref 0.4–1.2)
DEPRECATED RDW RBC AUTO: 43.8 FL (ref 35–45)
EOSINOPHIL NFR BLD AUTO: 4.2 %
EOSINOPHILS ABSOLUTE: 0.3 THOU/MM3 (ref 0–0.4)
ERYTHROCYTE [DISTWIDTH] IN BLOOD BY AUTOMATED COUNT: 14.3 % (ref 11.5–14.5)
GFR SERPL CREATININE-BSD FRML MDRD: > 60 ML/MIN/1.73M2
GLUCOSE SERPL-MCNC: 103 MG/DL (ref 70–108)
HCT VFR BLD AUTO: 41.2 % (ref 42–52)
HDLC SERPL-MCNC: 33 MG/DL
HGB BLD-MCNC: 12.8 GM/DL (ref 14–18)
IMM GRANULOCYTES # BLD AUTO: 0.02 THOU/MM3 (ref 0–0.07)
IMM GRANULOCYTES NFR BLD AUTO: 0.3 %
LDLC SERPL CALC-MCNC: 61 MG/DL
LYMPHOCYTES ABSOLUTE: 1.3 THOU/MM3 (ref 1–4.8)
LYMPHOCYTES NFR BLD AUTO: 19.5 %
MCH RBC QN AUTO: 26.3 PG (ref 26–33)
MCHC RBC AUTO-ENTMCNC: 31.1 GM/DL (ref 32.2–35.5)
MCV RBC AUTO: 84.6 FL (ref 80–94)
MONOCYTES ABSOLUTE: 0.5 THOU/MM3 (ref 0.4–1.3)
MONOCYTES NFR BLD AUTO: 7.1 %
NEUTROPHILS NFR BLD AUTO: 68.3 %
NRBC BLD AUTO-RTO: 0 /100 WBC
PLATELET # BLD AUTO: 231 THOU/MM3 (ref 130–400)
PMV BLD AUTO: 10.9 FL (ref 9.4–12.4)
POTASSIUM SERPL-SCNC: 4.2 MEQ/L (ref 3.5–5.2)
PSA SERPL-MCNC: 0.51 NG/ML (ref 0–1)
RBC # BLD AUTO: 4.87 MILL/MM3 (ref 4.7–6.1)
SEGMENTED NEUTROPHILS ABSOLUTE COUNT: 4.5 THOU/MM3 (ref 1.8–7.7)
SODIUM SERPL-SCNC: 143 MEQ/L (ref 135–145)
TRIGL SERPL-MCNC: 96 MG/DL (ref 0–199)
TSH SERPL DL<=0.005 MIU/L-ACNC: 2.97 UIU/ML (ref 0.4–4.2)
WBC # BLD AUTO: 6.6 THOU/MM3 (ref 4.8–10.8)

## 2023-07-16 PROCEDURE — 84443 ASSAY THYROID STIM HORMONE: CPT

## 2023-07-16 PROCEDURE — G0103 PSA SCREENING: HCPCS

## 2023-07-16 PROCEDURE — 82306 VITAMIN D 25 HYDROXY: CPT

## 2023-07-16 PROCEDURE — 80048 BASIC METABOLIC PNL TOTAL CA: CPT

## 2023-07-16 PROCEDURE — 85025 COMPLETE CBC W/AUTO DIFF WBC: CPT

## 2023-07-16 PROCEDURE — 36415 COLL VENOUS BLD VENIPUNCTURE: CPT

## 2023-07-16 PROCEDURE — 80061 LIPID PANEL: CPT

## 2024-07-20 ENCOUNTER — HOSPITAL ENCOUNTER (OUTPATIENT)
Age: 59
Discharge: HOME | End: 2024-07-20
Payer: COMMERCIAL

## 2024-07-20 LAB
25(OH)D3 SERPL-MCNC: 25 NG/ML (ref 30–100)
ALT SERPL W/O P-5'-P-CCNC: 8 U/L (ref 11–66)
ANION GAP SERPL CALC-SCNC: 11 MEQ/L (ref 8–16)
AST SERPL-CCNC: 13 U/L (ref 5–40)
BASOPHILS ABSOLUTE: 0 THOU/MM3 (ref 0–0.1)
BASOPHILS NFR BLD AUTO: 0.5 %
BUN SERPL-MCNC: 12 MG/DL (ref 7–22)
CALCIUM SERPL-MCNC: 8.8 MG/DL (ref 8.5–10.5)
CHLORIDE SERPL-SCNC: 105 MEQ/L (ref 98–111)
CHOLEST SERPL-MCNC: 135 MG/DL (ref 100–199)
CO2 SERPL-SCNC: 26 MEQ/L (ref 23–33)
CREAT SERPL-MCNC: 0.8 MG/DL (ref 0.4–1.2)
DEPRECATED MEAN GLUCOSE BLD GHB EST-ACNC: 99 MG/DL (ref 70–126)
DEPRECATED RDW RBC AUTO: 42.5 FL (ref 35–45)
EOSINOPHIL NFR BLD AUTO: 3.8 %
EOSINOPHILS ABSOLUTE: 0.2 THOU/MM3 (ref 0–0.4)
ERYTHROCYTE [DISTWIDTH] IN BLOOD BY AUTOMATED COUNT: 13.9 % (ref 11.5–14.5)
GFR SERPL CREATININE-BSD FRML MDRD: > 90 ML/MIN/1.73M2
GLUCOSE SERPL-MCNC: 100 MG/DL (ref 70–108)
HBA1C MFR BLD HPLC: 5.3 % (ref 4.4–6.4)
HCT VFR BLD AUTO: 39.9 % (ref 42–52)
HDLC SERPL-MCNC: 33 MG/DL
HGB BLD-MCNC: 12.8 GM/DL (ref 14–18)
IMM GRANULOCYTES # BLD AUTO: 0.02 THOU/MM3 (ref 0–0.07)
IMM GRANULOCYTES NFR BLD AUTO: 0.3 %
LDLC SERPL CALC-MCNC: 83 MG/DL
LYMPHOCYTES ABSOLUTE: 1.1 THOU/MM3 (ref 1–4.8)
LYMPHOCYTES NFR BLD AUTO: 17.8 %
MCH RBC QN AUTO: 26.9 PG (ref 26–33)
MCHC RBC AUTO-ENTMCNC: 32.1 GM/DL (ref 32.2–35.5)
MCV RBC AUTO: 83.8 FL (ref 80–94)
MONOCYTES ABSOLUTE: 0.4 THOU/MM3 (ref 0.4–1.3)
MONOCYTES NFR BLD AUTO: 6.6 %
NEUTROPHILS ABSOLUTE: 4.3 THOU/MM3 (ref 1.8–7.7)
NEUTROPHILS NFR BLD AUTO: 71 %
NRBC BLD AUTO-RTO: 0 /100 WBC
PLATELET # BLD AUTO: 224 THOU/MM3 (ref 130–400)
PMV BLD AUTO: 10.9 FL (ref 9.4–12.4)
POTASSIUM SERPL-SCNC: 3.9 MEQ/L (ref 3.5–5.2)
PSA SERPL-MCNC: 0.58 NG/ML (ref 0–1)
RBC # BLD AUTO: 4.76 MILL/MM3 (ref 4.7–6.1)
SODIUM SERPL-SCNC: 142 MEQ/L (ref 135–145)
TRIGL SERPL-MCNC: 96 MG/DL (ref 0–199)
WBC # BLD AUTO: 6.1 THOU/MM3 (ref 4.8–10.8)

## 2024-07-20 PROCEDURE — 80048 BASIC METABOLIC PNL TOTAL CA: CPT

## 2024-07-20 PROCEDURE — 36415 COLL VENOUS BLD VENIPUNCTURE: CPT

## 2024-07-20 PROCEDURE — 83036 HEMOGLOBIN GLYCOSYLATED A1C: CPT

## 2024-07-20 PROCEDURE — 84450 TRANSFERASE (AST) (SGOT): CPT

## 2024-07-20 PROCEDURE — 82306 VITAMIN D 25 HYDROXY: CPT

## 2024-07-20 PROCEDURE — G0103 PSA SCREENING: HCPCS

## 2024-07-20 PROCEDURE — 80061 LIPID PANEL: CPT

## 2024-07-20 PROCEDURE — 85025 COMPLETE CBC W/AUTO DIFF WBC: CPT

## 2024-07-20 PROCEDURE — 84460 ALANINE AMINO (ALT) (SGPT): CPT

## 2025-07-10 ENCOUNTER — OFFICE VISIT (OUTPATIENT)
Dept: FAMILY MEDICINE CLINIC | Age: 60
End: 2025-07-10
Payer: COMMERCIAL

## 2025-07-10 VITALS
HEIGHT: 69 IN | SYSTOLIC BLOOD PRESSURE: 122 MMHG | BODY MASS INDEX: 38.51 KG/M2 | DIASTOLIC BLOOD PRESSURE: 74 MMHG | HEART RATE: 70 BPM | WEIGHT: 260 LBS | OXYGEN SATURATION: 98 %

## 2025-07-10 DIAGNOSIS — Z13.1 SCREENING FOR DIABETES MELLITUS: ICD-10-CM

## 2025-07-10 DIAGNOSIS — E78.00 PURE HYPERCHOLESTEROLEMIA: Primary | ICD-10-CM

## 2025-07-10 DIAGNOSIS — Z12.5 SPECIAL SCREENING FOR MALIGNANT NEOPLASM OF PROSTATE: ICD-10-CM

## 2025-07-10 DIAGNOSIS — K21.9 GASTROESOPHAGEAL REFLUX DISEASE WITHOUT ESOPHAGITIS: ICD-10-CM

## 2025-07-10 DIAGNOSIS — D64.9 ANEMIA, UNSPECIFIED TYPE: ICD-10-CM

## 2025-07-10 DIAGNOSIS — E55.9 VITAMIN D DEFICIENCY: ICD-10-CM

## 2025-07-10 DIAGNOSIS — L30.9 DERMATITIS: ICD-10-CM

## 2025-07-10 PROCEDURE — 99204 OFFICE O/P NEW MOD 45 MIN: CPT | Performed by: NURSE PRACTITIONER

## 2025-07-10 PROCEDURE — 96372 THER/PROPH/DIAG INJ SC/IM: CPT | Performed by: NURSE PRACTITIONER

## 2025-07-10 RX ORDER — TURMERIC ROOT EXTRACT 500 MG
2 TABLET ORAL DAILY
COMMUNITY

## 2025-07-10 RX ORDER — VIBEGRON 75 MG/1
75 TABLET, FILM COATED ORAL DAILY
COMMUNITY
Start: 2025-05-27

## 2025-07-10 RX ORDER — SOLIFENACIN SUCCINATE 5 MG/1
5 TABLET, FILM COATED ORAL DAILY
COMMUNITY
Start: 2025-07-03

## 2025-07-10 RX ORDER — TRIAMCINOLONE ACETONIDE 40 MG/ML
60 INJECTION, SUSPENSION INTRA-ARTICULAR; INTRAMUSCULAR ONCE
Status: COMPLETED | OUTPATIENT
Start: 2025-07-10 | End: 2025-07-10

## 2025-07-10 RX ORDER — ATORVASTATIN CALCIUM 10 MG/1
10 TABLET, FILM COATED ORAL DAILY
Qty: 90 TABLET | Refills: 3 | Status: SHIPPED | OUTPATIENT
Start: 2025-07-10

## 2025-07-10 RX ORDER — PHENOL 1.4 %
2 AEROSOL, SPRAY (ML) MUCOUS MEMBRANE DAILY
COMMUNITY

## 2025-07-10 RX ADMIN — TRIAMCINOLONE ACETONIDE 60 MG: 40 INJECTION, SUSPENSION INTRA-ARTICULAR; INTRAMUSCULAR at 08:14

## 2025-07-10 SDOH — ECONOMIC STABILITY: FOOD INSECURITY: WITHIN THE PAST 12 MONTHS, THE FOOD YOU BOUGHT JUST DIDN'T LAST AND YOU DIDN'T HAVE MONEY TO GET MORE.: NEVER TRUE

## 2025-07-10 SDOH — ECONOMIC STABILITY: FOOD INSECURITY: WITHIN THE PAST 12 MONTHS, YOU WORRIED THAT YOUR FOOD WOULD RUN OUT BEFORE YOU GOT MONEY TO BUY MORE.: NEVER TRUE

## 2025-07-10 ASSESSMENT — PATIENT HEALTH QUESTIONNAIRE - PHQ9
2. FEELING DOWN, DEPRESSED OR HOPELESS: NOT AT ALL
SUM OF ALL RESPONSES TO PHQ QUESTIONS 1-9: 0
1. LITTLE INTEREST OR PLEASURE IN DOING THINGS: NOT AT ALL
SUM OF ALL RESPONSES TO PHQ QUESTIONS 1-9: 0

## 2025-07-10 ASSESSMENT — ENCOUNTER SYMPTOMS
ABDOMINAL PAIN: 0
STRIDOR: 0
VOMITING: 0
COUGH: 0
NAUSEA: 0
DIARRHEA: 0
SHORTNESS OF BREATH: 0
SINUS PRESSURE: 0
CONSTIPATION: 0
WHEEZING: 0
SORE THROAT: 0
ABDOMINAL DISTENTION: 0
BACK PAIN: 0
COLOR CHANGE: 0
CHEST TIGHTNESS: 0

## 2025-07-10 NOTE — PROGRESS NOTES
Medication(s) given during visit:    Administrations This Visit       triamcinolone acetonide (KENALOG-40) injection 60 mg       Admin Date  07/10/2025  08:14 Action  Given Dose  60 mg Route  IntraMUSCular Site  Ventrogluteal Right Documented By  Danitza Aparicio LPN    NDC: 4502-0033-26    Lot#: [id number]    : [employer] (PRIMARY CARE)    Patient Supplied?: No                    Patient instructed to remain in clinic for 20 minutes after injection and was advised to report any adverse reaction to me immediately.    
Affect: Mood normal.         Behavior: Behavior normal.         Thought Content: Thought content normal.         Judgment: Judgment normal.              On this date 7/10/2025 I have spent 47 minutes reviewing previous notes, test results and face to face with the patient discussing the diagnosis and importance of compliance with the treatment plan as well as documenting on the day of the visit.    The patient (or guardian, if applicable) and other individuals in attendance with the patient were advised that Artificial Intelligence will be utilized during this visit to record, process the conversation to generate a clinical note and to support improvement of the AI technology. The patient (or guardian, if applicable) and other individuals in attendance at the appointment consented to the use of AI, including the recording.      An electronic signature was used to authenticate this note.    --JUVENAL Santo - CNP